# Patient Record
Sex: MALE | Race: WHITE | NOT HISPANIC OR LATINO | URBAN - METROPOLITAN AREA
[De-identification: names, ages, dates, MRNs, and addresses within clinical notes are randomized per-mention and may not be internally consistent; named-entity substitution may affect disease eponyms.]

---

## 2021-07-01 ENCOUNTER — PATIENT MESSAGE (OUTPATIENT)
Dept: ADMINISTRATIVE | Facility: OTHER | Age: 61
End: 2021-07-01

## 2023-05-03 ENCOUNTER — PATIENT MESSAGE (OUTPATIENT)
Dept: RESEARCH | Facility: HOSPITAL | Age: 63
End: 2023-05-03

## 2025-02-04 PROBLEM — E66.01 SEVERE OBESITY (BMI 35.0-39.9) WITH COMORBIDITY: Status: ACTIVE | Noted: 2025-02-04

## 2025-06-11 ENCOUNTER — HOSPITAL ENCOUNTER (OUTPATIENT)
Dept: RADIOLOGY | Facility: HOSPITAL | Age: 65
Discharge: HOME | End: 2025-06-11
Payer: COMMERCIAL

## 2025-06-11 ENCOUNTER — OFFICE VISIT (OUTPATIENT)
Dept: ORTHOPEDICS | Facility: CLINIC | Age: 65
End: 2025-06-11
Payer: COMMERCIAL

## 2025-06-11 DIAGNOSIS — M25.532 LEFT WRIST PAIN: ICD-10-CM

## 2025-06-11 DIAGNOSIS — M67.432 GANGLION CYST OF WRIST, LEFT: ICD-10-CM

## 2025-06-11 DIAGNOSIS — M25.532 LEFT WRIST PAIN: Primary | ICD-10-CM

## 2025-06-11 PROCEDURE — 73110 X-RAY EXAM OF WRIST: CPT | Mod: TC,LT

## 2025-06-11 PROCEDURE — 73110 X-RAY EXAM OF WRIST: CPT | Mod: 26,LT,, | Performed by: STUDENT IN AN ORGANIZED HEALTH CARE EDUCATION/TRAINING PROGRAM

## 2025-06-11 PROCEDURE — 99999 PR PBB SHADOW E&M-EST. PATIENT-LVL III: CPT | Mod: PBBFAC,,, | Performed by: ORTHOPAEDIC SURGERY

## 2025-06-11 PROCEDURE — 1159F MED LIST DOCD IN RCRD: CPT | Mod: CPTII,S$GLB,, | Performed by: ORTHOPAEDIC SURGERY

## 2025-06-11 PROCEDURE — 99204 OFFICE O/P NEW MOD 45 MIN: CPT | Mod: S$GLB,,, | Performed by: ORTHOPAEDIC SURGERY

## 2025-06-11 NOTE — PROGRESS NOTES
CHEIKH Dunn M.D.  Orthopaedic Hand and Wrist Surgery  [facility]    Patient ID: Mynor Orozco  YOB: 1960  MRN: [id number]    Provider Note/Medical Decision Makin. Left wrist pain  -     Cancel: X-Ray Wrist Complete Left; Future; Expected date: 2025  -     X-Ray Wrist Complete Left; Future; Expected date: 2025    2. Ganglion cyst of wrist, left  Assessment & Plan:  The patient and I talked at length about the natural history and pathophysiology of left volar carpal ganglion cyst, he understands that this is a chronic problem which may have acute episodic exacerbations.   Symptoms may resolve, worsen and even become permanent.  The patient understands the treatment options including observation, activity modification, therapy, NSAIDs, splints, injections and the surgical options including excision.  The risks of the diagnosis and the treatment options include pain, infection, bleeding, damage to nerves and vessels, stiffness, scarring, incomplete relief or recurrence of symptoms, poor pain and functional outcomes.  Unique risks of this diagnosis and the treatment include recurrence.  The patient has elected to proceed with observation and we will follow up as needed.      Orders:  -     Ambulatory referral/consult to Orthopedics  -     Cancel: X-Ray Wrist Complete Left; Future; Expected date: 2025  -     X-Ray Wrist Complete Left; Future; Expected date: 2025         Chief Complaint: left wrist ganglion cyst    Referred By: David Ramsay    History of Present Illness: Mynor Orozco is a 65 y.o. male presents for evaluation of left wrist ganglion cyst. He noticed it about 1 month ago. It has gotten bigger over time. He denies any pain.  Denies any history of injury    Patient was queried and this is the extent of the patients current complaints today.    Past Medical History:     Estimated body mass index is 39.31 kg/m² as calculated from the  "following:    Height as of 6/3/25: 5' 10" (1.778 m).    Weight as of 6/3/25: 124.3 kg (274 lb).  Past Medical History:   Diagnosis Date    BPH (benign prostatic hyperplasia)     COVID-19 05/27/2021    Unspecified rotator cuff tear or rupture of unspecified shoulder, not specified as traumatic     left     Past Surgical History:   Procedure Laterality Date    ARTHROSCOPIC REPAIR OF ROTATOR CUFF OF SHOULDER Left     scheduled 07/12/2023 Dr Donnie Stokes    FRACTURE SURGERY      bilaretal ankle    JOINT REPLACEMENT Left     Knee     Family History   Problem Relation Name Age of Onset    Ulcers Mother      Glaucoma Father       Social History[1]  Medication List with Changes/Refills   Current Medications    MULTIVITAMIN (THERAGRAN) PER TABLET    Take 1 tablet by mouth once daily.    OMEPRAZOLE (PRILOSEC) 20 MG CAPSULE    Take 1 capsule by mouth once daily    TADALAFIL (CIALIS) 20 MG TAB    Take 1 tablet (20 mg total) by mouth once daily.    TAMSULOSIN (FLOMAX) 0.4 MG CAP    Take 1 capsule (0.4 mg total) by mouth once daily.     Review of patient's allergies indicates:   Allergen Reactions    Codeine      ROS    Physical Exam:   GENERAL: Well appearing, appropriate for stated age, no acute distress.  CARDIOVASCULAR:  Fingers have good brisk refill and good turgor.   PULMONARY: Respirations are even and non-labored.  NEURO: Awake, alert, and oriented x 3.  PSYCH: Mood & affect are appropriate.  Ortho/SPM Exam  Hand/Wrist Musculoskeletal Exam  Full range of motion  Volar ganglion cyst 2 cm in diameter  Wrist flexion to 80°  Wrist extension 80°  Full pronation and supination  Negative Finkelstein's  Mildly positive thumb CMC grind  Negative Freitas's    Imaging:    Relevant imaging results reviewed and interpreted by me, discussed with the patient and / or family today.   No soft tissue calcification in the area of the cyst there is evidence of the DC deformity and thumb CMC arthritis.        Provider Note/Medical " Decision Makin. Left wrist pain  -     Cancel: X-Ray Wrist Complete Left; Future; Expected date: 2025  -     X-Ray Wrist Complete Left; Future; Expected date: 2025    2. Ganglion cyst of wrist, left  Assessment & Plan:  The patient and I talked at length about the natural history and pathophysiology of left volar carpal ganglion cyst, he understands that this is a chronic problem which may have acute episodic exacerbations.   Symptoms may resolve, worsen and even become permanent.  The patient understands the treatment options including observation, activity modification, therapy, NSAIDs, splints, injections and the surgical options including excision.  The risks of the diagnosis and the treatment options include pain, infection, bleeding, damage to nerves and vessels, stiffness, scarring, incomplete relief or recurrence of symptoms, poor pain and functional outcomes.  Unique risks of this diagnosis and the treatment include recurrence.  The patient has elected to proceed with observation and we will follow up as needed.      Orders:  -     Ambulatory referral/consult to Orthopedics  -     Cancel: X-Ray Wrist Complete Left; Future; Expected date: 2025  -     X-Ray Wrist Complete Left; Future; Expected date: 2025         I discussed worrisome and red flag signs and symptoms with the patient. The patient expressed understanding and agreed to alert me immediately or to go to the emergency room if they experience any of these.   Treatment plan was developed with input from the patient/family, and they expressed understanding and agreement with the plan. All questions were answered today.    There are no Patient Instructions on file for this visit.    CHEIKH Dunn M.D.  [facility]  Orthopedic Hand and Wrist Surgeon    [facility]  Dr. Tonny Dunn   [place]   [employer]     Disclaimer: This note was prepared using a voice  recognition system and is likely to have sound alike errors within the text.            [1]   Social History  Socioeconomic History    Marital status:     Number of children: 2   Occupational History    Occupation: [employer]ron     Comment: [employer]   Tobacco Use    Smoking status: Former     Current packs/day: 0.00     Types: Cigarettes     Quit date: 2013     Years since quittin.7    Smokeless tobacco: Never   Substance and Sexual Activity    Alcohol use: Yes    Drug use: Never    Sexual activity: Yes     Partners: Female     Social Drivers of Health     Financial Resource Strain: Low Risk  (2025)    Overall Financial Resource Strain (CARDIA)     Difficulty of Paying Living Expenses: Not hard at all   Food Insecurity: No Food Insecurity (2025)    Hunger Vital Sign     Worried About Running Out of Food in the Last Year: Never true     Ran Out of Food in the Last Year: Never true   Physical Activity: Inactive (2025)    Exercise Vital Sign     Days of Exercise per Week: 2 days     Minutes of Exercise per Session: 0 min   Stress: Stress Concern Present (2025)    [employer] - Occupational Stress Questionnaire     Feeling of Stress : To some extent   Housing Stability: Unknown (2025)    Housing Stability Vital Sign     Unable to Pay for Housing in the Last Year: No

## 2025-06-11 NOTE — ASSESSMENT & PLAN NOTE
The patient and I talked at length about the natural history and pathophysiology of left volar carpal ganglion cyst, he understands that this is a chronic problem which may have acute episodic exacerbations.   Symptoms may resolve, worsen and even become permanent.  The patient understands the treatment options including observation, activity modification, therapy, NSAIDs, splints, injections and the surgical options including excision.  The risks of the diagnosis and the treatment options include pain, infection, bleeding, damage to nerves and vessels, stiffness, scarring, incomplete relief or recurrence of symptoms, poor pain and functional outcomes.  Unique risks of this diagnosis and the treatment include recurrence.  The patient has elected to proceed with observation and we will follow up as needed.